# Patient Record
Sex: FEMALE | Race: WHITE | NOT HISPANIC OR LATINO | Employment: FULL TIME | URBAN - NONMETROPOLITAN AREA
[De-identification: names, ages, dates, MRNs, and addresses within clinical notes are randomized per-mention and may not be internally consistent; named-entity substitution may affect disease eponyms.]

---

## 2017-01-17 ENCOUNTER — HOSPITAL ENCOUNTER (OUTPATIENT)
Dept: GENERAL RADIOLOGY | Facility: HOSPITAL | Age: 57
Discharge: HOME | End: 2017-01-17

## 2018-01-24 ENCOUNTER — TRANSCRIBE ORDERS (OUTPATIENT)
Dept: ADMINISTRATIVE | Facility: HOSPITAL | Age: 58
End: 2018-01-24

## 2018-01-24 DIAGNOSIS — Z12.39 BREAST CANCER SCREENING: Primary | ICD-10-CM

## 2018-01-30 ENCOUNTER — HOSPITAL ENCOUNTER (OUTPATIENT)
Dept: MAMMOGRAPHY | Facility: HOSPITAL | Age: 58
Discharge: HOME | End: 2018-01-30
Admitting: PHYSICIAN ASSISTANT

## 2018-01-30 DIAGNOSIS — Z12.39 BREAST CANCER SCREENING: ICD-10-CM

## 2018-01-30 PROCEDURE — 77067 SCR MAMMO BI INCL CAD: CPT

## 2018-01-30 PROCEDURE — 77063 BREAST TOMOSYNTHESIS BI: CPT

## 2018-09-04 ENCOUNTER — OFFICE VISIT (OUTPATIENT)
Dept: PULMONOLOGY | Facility: CLINIC | Age: 58
End: 2018-09-04

## 2018-09-04 VITALS
OXYGEN SATURATION: 94 % | HEIGHT: 65 IN | HEART RATE: 58 BPM | RESPIRATION RATE: 18 BRPM | SYSTOLIC BLOOD PRESSURE: 110 MMHG | WEIGHT: 260 LBS | DIASTOLIC BLOOD PRESSURE: 70 MMHG | BODY MASS INDEX: 43.32 KG/M2

## 2018-09-04 DIAGNOSIS — R06.83 SNORING: Primary | ICD-10-CM

## 2018-09-04 DIAGNOSIS — G47.33 OBSTRUCTIVE SLEEP APNEA: ICD-10-CM

## 2018-09-04 DIAGNOSIS — E66.01 MORBID OBESITY, UNSPECIFIED OBESITY TYPE (HCC): ICD-10-CM

## 2018-09-04 DIAGNOSIS — G47.19 EXCESSIVE DAYTIME SLEEPINESS: ICD-10-CM

## 2018-09-04 PROCEDURE — 99214 OFFICE O/P EST MOD 30 MIN: CPT | Performed by: INTERNAL MEDICINE

## 2018-09-04 RX ORDER — ATORVASTATIN CALCIUM 10 MG/1
10 TABLET, FILM COATED ORAL DAILY
COMMUNITY
Start: 2018-06-10

## 2018-09-04 RX ORDER — LOSARTAN POTASSIUM AND HYDROCHLOROTHIAZIDE 12.5; 5 MG/1; MG/1
TABLET ORAL
COMMUNITY
Start: 2018-06-10

## 2018-09-04 RX ORDER — BISOPROLOL FUMARATE 5 MG/1
5 TABLET, FILM COATED ORAL DAILY
COMMUNITY
Start: 2018-06-10 | End: 2018-12-04

## 2018-09-04 RX ORDER — AMITRIPTYLINE HYDROCHLORIDE 25 MG/1
25 TABLET, FILM COATED ORAL NIGHTLY
COMMUNITY
Start: 2018-08-16

## 2018-09-04 RX ORDER — OMEPRAZOLE 40 MG/1
CAPSULE, DELAYED RELEASE ORAL
COMMUNITY
Start: 2018-08-06 | End: 2018-12-04

## 2018-09-04 NOTE — PROGRESS NOTES
"Chief Complaint   Patient presents with   • Follow-up   • Sleeping Problem         Subjective   Dorie Garber is a 58 y.o. female.     History of Present Illness   Patient in follow-up after 2 years of last been seen in this office.  Patient says that since her last visit she's developed atrial fibrillation which was mostly paroxysmal.    The patient was also found to have nocturnal hypoxemia, after her oxygen levels were assessed by her cardiologist after the episode of atrial fibrillation.  The patient actually does mention increased snoring and tendency to fall asleep while watching TV, which is a definite change from a year and half ago.    She denies any significant headaches in the morning.  She does have daytime sleepiness and tiredness as well.    The patient does drink 3-4 cups of soda per day.      The following portions of the patient's history were reviewed and updated as appropriate: allergies, current medications, past family history, past medical history, past social history and past surgical history.    Review of Systems   Constitutional: Negative for chills and fever.   HENT: Negative for sinus pressure, sneezing and sore throat.    Respiratory: Negative for cough and shortness of breath.    Psychiatric/Behavioral: Positive for sleep disturbance.       Objective   Visit Vitals  /70   Pulse 58   Resp 18   Ht 165.1 cm (65\")   Wt 118 kg (260 lb)   SpO2 94%   BMI 43.27 kg/m²       Physical Exam   Constitutional: She is oriented to person, place, and time. She appears well-developed and well-nourished.   HENT:   Head: Atraumatic.   Crowded oropharynx.    Cardiovascular: Normal rate and regular rhythm.    Pulmonary/Chest: Effort normal.   Musculoskeletal:   Gait was normal.   Neurological: She is alert and oriented to person, place, and time.   Psychiatric: She has a normal mood and affect.   Vitals reviewed.      Assessment/Plan   Dorie was seen today for follow-up and sleeping " problem.    Diagnoses and all orders for this visit:    Snoring  -     Polysomnography 4 or More Parameters; Future    Excessive daytime sleepiness  -     Polysomnography 4 or More Parameters; Future    Obstructive sleep apnea  -     Polysomnography 4 or More Parameters; Future    Morbid obesity, unspecified obesity type (CMS/HCC)  -     Polysomnography 4 or More Parameters; Future         Return in about 3 months (around 12/4/2018) for Recheck, Sleep study, For July.    DISCUSSION (if any):  Based on her symptoms, she will definitely need a sleep study will be performed.    I once again discussed sleep hygiene measures.    I will try to obtain her last overnight pulse oximetry testing from Dr. Mccain's office.    Due to recent h/o atrial fibrillation, she will need to undergo PSG in the lab.      Dictated utilizing Dragon dictation.    This document was electronically signed by Xavier Mcarthur MD September 4, 2018  2:00 PM

## 2018-09-14 ENCOUNTER — HOSPITAL ENCOUNTER (OUTPATIENT)
Dept: SLEEP MEDICINE | Facility: HOSPITAL | Age: 58
Discharge: HOME | End: 2018-09-14
Attending: INTERNAL MEDICINE | Admitting: INTERNAL MEDICINE

## 2018-09-14 DIAGNOSIS — G47.19 EXCESSIVE DAYTIME SLEEPINESS: ICD-10-CM

## 2018-09-14 DIAGNOSIS — G47.33 OBSTRUCTIVE SLEEP APNEA: ICD-10-CM

## 2018-09-14 DIAGNOSIS — R06.83 SNORING: ICD-10-CM

## 2018-09-14 DIAGNOSIS — E66.01 MORBID OBESITY, UNSPECIFIED OBESITY TYPE (HCC): ICD-10-CM

## 2018-09-14 PROCEDURE — 95810 POLYSOM 6/> YRS 4/> PARAM: CPT | Performed by: INTERNAL MEDICINE

## 2018-09-14 PROCEDURE — 95810 POLYSOM 6/> YRS 4/> PARAM: CPT

## 2018-09-21 DIAGNOSIS — G47.33 OSA (OBSTRUCTIVE SLEEP APNEA): Primary | ICD-10-CM

## 2018-12-04 ENCOUNTER — OFFICE VISIT (OUTPATIENT)
Dept: PULMONOLOGY | Facility: CLINIC | Age: 58
End: 2018-12-04

## 2018-12-04 VITALS
OXYGEN SATURATION: 97 % | DIASTOLIC BLOOD PRESSURE: 70 MMHG | HEIGHT: 65 IN | WEIGHT: 246 LBS | BODY MASS INDEX: 40.98 KG/M2 | HEART RATE: 62 BPM | SYSTOLIC BLOOD PRESSURE: 128 MMHG

## 2018-12-04 DIAGNOSIS — G47.19 EXCESSIVE DAYTIME SLEEPINESS: ICD-10-CM

## 2018-12-04 DIAGNOSIS — E66.01 MORBID OBESITY, UNSPECIFIED OBESITY TYPE (HCC): ICD-10-CM

## 2018-12-04 DIAGNOSIS — G47.33 OSA (OBSTRUCTIVE SLEEP APNEA): Primary | ICD-10-CM

## 2018-12-04 PROCEDURE — 99213 OFFICE O/P EST LOW 20 MIN: CPT | Performed by: NURSE PRACTITIONER

## 2018-12-04 RX ORDER — OMEPRAZOLE 20 MG/1
CAPSULE, DELAYED RELEASE ORAL
Refills: 2 | COMMUNITY
Start: 2018-11-21

## 2018-12-04 NOTE — PROGRESS NOTES
"Chief Complaint   Patient presents with   • Follow-up   • Snoring   • Shortness of Breath         Subjective   Dorie Garber is a 58 y.o. female.     History of Present Illness   The patient comes in today for follow-up of obstructive sleep apnea.    She is using AutoPap at a pressure of 6/14 with a full facemask.  She feels rested upon awakening and has more energy.    She had bariatric surgery in October and states overall she's been feeling much better.    The following portions of the patient's history were reviewed and updated as appropriate: allergies, current medications, past family history, past medical history, past social history and past surgical history.    Review of Systems   Constitutional: Negative for chills and fever.   HENT: Negative for sinus pressure, sneezing and sore throat.    Respiratory: Negative for cough, shortness of breath and wheezing.        Objective   Visit Vitals  /70 (BP Location: Left arm, Patient Position: Sitting, Cuff Size: Adult)   Pulse 62   Ht 165.1 cm (65\")   Wt 112 kg (246 lb)   SpO2 97%   BMI 40.94 kg/m²     Physical Exam   Constitutional: She is oriented to person, place, and time. She appears well-developed and well-nourished.   HENT:   Head: Atraumatic.   Crowded oropharynx.    Eyes: EOM are normal.   Musculoskeletal:   Gait was normal.   Neurological: She is alert and oriented to person, place, and time.   Psychiatric: She has a normal mood and affect.   Vitals reviewed.          Assessment/Plan   Dorie was seen today for follow-up, snoring and shortness of breath.    Diagnoses and all orders for this visit:    NELLY (obstructive sleep apnea)  -     BIPAP / CPAP Adjustment    Excessive daytime sleepiness    Morbid obesity, unspecified obesity type (CMS/HCC)           Return in about 4 months (around 4/4/2019) for Recheck, For Dr. Mcarthur.    DISCUSSION (if any):  She is compliant with AutoPap use at 97%.  Her average AHI is 1.7.  I've asked her to continue using " AutoPap at the current pressure of 6/14 with a full facemask.      Dictated utilizing Dragon dictation.    This document was electronically signed by VAISHNAVI Castillo December 12, 2018  12:09 PM

## 2019-01-23 ENCOUNTER — TRANSCRIBE ORDERS (OUTPATIENT)
Dept: ADMINISTRATIVE | Facility: HOSPITAL | Age: 59
End: 2019-01-23

## 2019-01-23 DIAGNOSIS — Z12.39 SCREENING BREAST EXAMINATION: Primary | ICD-10-CM

## 2019-03-08 ENCOUNTER — HOSPITAL ENCOUNTER (OUTPATIENT)
Dept: MAMMOGRAPHY | Facility: HOSPITAL | Age: 59
Discharge: HOME | End: 2019-03-08
Admitting: PHYSICIAN ASSISTANT

## 2019-03-08 DIAGNOSIS — Z12.39 SCREENING BREAST EXAMINATION: ICD-10-CM

## 2019-03-08 PROCEDURE — 77063 BREAST TOMOSYNTHESIS BI: CPT

## 2019-03-08 PROCEDURE — 77067 SCR MAMMO BI INCL CAD: CPT

## 2019-07-16 ENCOUNTER — OFFICE VISIT (OUTPATIENT)
Dept: NEUROSURGERY | Facility: CLINIC | Age: 59
End: 2019-07-16

## 2019-07-16 VITALS — WEIGHT: 246 LBS | HEIGHT: 65 IN | BODY MASS INDEX: 40.98 KG/M2

## 2019-07-16 DIAGNOSIS — M50.20 HERNIATION OF CERVICAL INTERVERTEBRAL DISC: Primary | ICD-10-CM

## 2019-07-16 PROCEDURE — 99243 OFF/OP CNSLTJ NEW/EST LOW 30: CPT | Performed by: NEUROLOGICAL SURGERY

## 2019-07-16 NOTE — PROGRESS NOTES
Subjective   Patient ID: Dorie Garber is a 58 y.o. 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  She has a history of pain in the neck and left arm that began approximately 6 weeks ago and has shown improvement in the past 2 weeks or so.  It radiates to her left arm into the index and middle finger.  She has been taking physical therapy.  She had a similar episode about 2 years ago that resolved spontaneously.  She has been using gabapentin.  She used amitriptyline 2 years ago which appeared to have some help in resolving her pain.    Review of Radiographic Studies:  Cervical MRI scan dated 6/24/2019 shows a cervical disc herniation at C6-7 on the left, and a mild stenosis at C5-6.    The following portions of the patient's history were reviewed, updated as appropriate and approved: allergies, current medications, past family history, past medical history, past social history, past surgical history, review of systems and problem list.  Review of Systems   Constitutional: Negative for activity change, appetite change, chills, diaphoresis, fatigue, fever and unexpected weight change.   HENT: Negative for congestion, dental problem, drooling, ear discharge, ear pain, facial swelling, hearing loss, mouth sores, nosebleeds, postnasal drip, rhinorrhea, sinus pressure, sneezing, sore throat, tinnitus, trouble swallowing and voice change.    Eyes: Negative for photophobia, pain, discharge, redness, itching and visual disturbance.   Respiratory: Negative for apnea, cough, choking, chest tightness, shortness of breath, wheezing and stridor.    Cardiovascular: Negative for chest pain, palpitations and leg swelling.   Gastrointestinal: Negative for abdominal distention, abdominal pain, anal bleeding, blood in stool, constipation, diarrhea,  nausea, rectal pain and vomiting.   Endocrine: Negative for cold intolerance, heat intolerance, polydipsia, polyphagia and polyuria.   Genitourinary: Negative for decreased urine volume, difficulty urinating, dysuria, enuresis, flank pain, frequency, genital sores, hematuria and urgency.   Musculoskeletal: Positive for myalgias and neck pain. Negative for arthralgias, back pain, gait problem, joint swelling and neck stiffness.   Skin: Negative for color change, pallor, rash and wound.   Allergic/Immunologic: Negative for environmental allergies, food allergies and immunocompromised state.   Neurological: Positive for dizziness. Negative for tremors, seizures, syncope, facial asymmetry, speech difficulty, weakness, light-headedness, numbness and headaches.   Hematological: Negative for adenopathy. Does not bruise/bleed easily.   Psychiatric/Behavioral: Negative for agitation, behavioral problems, confusion, decreased concentration, dysphoric mood, hallucinations, self-injury, sleep disturbance and suicidal ideas. The patient is not nervous/anxious and is not hyperactive.        Objective     NEUROLOGICAL EXAMINATION:      MENTAL STATUS:  Alert and oriented.  Speech intact.  Recent and remote memory intact.      CRANIAL NERVES:  Cranial nerve II:  Visual fields are full.  Cranial nerves III, IV and VI:  PERRLADC.  Extraocular movements are intact.  Nystagmus is not present.  Cranial nerve V:  Facial sensation is intact.  Cranial nerve VII:  Muscles of facial expression reveal no asymmetry.  Cranial nerve VIII:  Hearing is intact.  Cranial nerves IX and X:  Palate elevates symmetrically.  Cranial nerve XI:  Shoulder shrug is intact.  Cranial nerve XII:  Tongue is midline without evidence of atrophy or fasciculation.    MUSCULOSKELETAL: Cervical range of motion symmetrical and intact.    MOTOR: No deltoid, biceps, triceps, or  strength weakness on either side.    SENSATION: Numbness of the index and middle finger on  the left with some numbness of the tip of the thumb    REFLEXES:  DTR trace and difficult to elicit bilaterally biceps and triceps    Assessment   Symptomatic cervical disc herniation C6-7 left       Plan   Her treatment plan is appropriate already.  Physical therapy is the treatment of choice.  Surgery will not be necessary with this early improvement in the radiculopathy.  No different recommendation to make at this time.  Continue with weight loss.       Marbin Shaw MD

## 2020-07-14 ENCOUNTER — TRANSCRIBE ORDERS (OUTPATIENT)
Dept: MAMMOGRAPHY | Facility: HOSPITAL | Age: 60
End: 2020-07-14

## 2020-07-14 DIAGNOSIS — Z12.31 ENCOUNTER FOR SCREENING MAMMOGRAM FOR BREAST CANCER: Primary | ICD-10-CM

## 2020-08-18 ENCOUNTER — HOSPITAL ENCOUNTER (OUTPATIENT)
Dept: MAMMOGRAPHY | Facility: HOSPITAL | Age: 60
Discharge: HOME | End: 2020-08-18
Admitting: PHYSICIAN ASSISTANT

## 2020-08-18 DIAGNOSIS — Z12.31 ENCOUNTER FOR SCREENING MAMMOGRAM FOR BREAST CANCER: ICD-10-CM

## 2020-08-18 PROCEDURE — 77063 BREAST TOMOSYNTHESIS BI: CPT

## 2020-08-18 PROCEDURE — 77067 SCR MAMMO BI INCL CAD: CPT

## 2022-04-04 ENCOUNTER — TRANSCRIBE ORDERS (OUTPATIENT)
Dept: ADMINISTRATIVE | Facility: HOSPITAL | Age: 62
End: 2022-04-04

## 2022-04-04 DIAGNOSIS — Z12.39 BREAST SCREENING: Primary | ICD-10-CM

## 2022-06-02 ENCOUNTER — HOSPITAL ENCOUNTER (OUTPATIENT)
Dept: MAMMOGRAPHY | Facility: HOSPITAL | Age: 62
Discharge: HOME | End: 2022-06-02
Admitting: PHYSICIAN ASSISTANT

## 2022-06-02 DIAGNOSIS — Z12.39 BREAST SCREENING: ICD-10-CM

## 2022-06-02 PROCEDURE — 77067 SCR MAMMO BI INCL CAD: CPT

## 2022-06-02 PROCEDURE — 77063 BREAST TOMOSYNTHESIS BI: CPT

## 2023-03-02 ENCOUNTER — TRANSCRIBE ORDERS (OUTPATIENT)
Dept: ADMINISTRATIVE | Facility: HOSPITAL | Age: 63
End: 2023-03-02
Payer: COMMERCIAL

## 2023-03-02 DIAGNOSIS — Z12.31 VISIT FOR SCREENING MAMMOGRAM: Primary | ICD-10-CM

## 2023-06-07 ENCOUNTER — HOSPITAL ENCOUNTER (OUTPATIENT)
Dept: MAMMOGRAPHY | Facility: HOSPITAL | Age: 63
Discharge: HOME | End: 2023-06-07
Admitting: PHYSICIAN ASSISTANT
Payer: COMMERCIAL

## 2023-06-07 DIAGNOSIS — Z12.31 VISIT FOR SCREENING MAMMOGRAM: ICD-10-CM

## 2023-06-07 PROCEDURE — 77063 BREAST TOMOSYNTHESIS BI: CPT

## 2023-06-07 PROCEDURE — 77067 SCR MAMMO BI INCL CAD: CPT

## 2023-06-13 ENCOUNTER — TRANSCRIBE ORDERS (OUTPATIENT)
Dept: ADMINISTRATIVE | Facility: HOSPITAL | Age: 63
End: 2023-06-13
Payer: COMMERCIAL

## 2023-06-13 DIAGNOSIS — R92.8 ABNORMAL MAMMOGRAM: Primary | ICD-10-CM

## 2023-09-07 ENCOUNTER — HOSPITAL ENCOUNTER (OUTPATIENT)
Dept: ULTRASOUND IMAGING | Facility: HOSPITAL | Age: 63
Discharge: HOME | End: 2023-09-07
Payer: COMMERCIAL

## 2023-09-07 ENCOUNTER — HOSPITAL ENCOUNTER (OUTPATIENT)
Dept: MAMMOGRAPHY | Facility: HOSPITAL | Age: 63
Discharge: HOME | End: 2023-09-07
Payer: COMMERCIAL

## 2023-09-07 DIAGNOSIS — R92.8 ABNORMAL MAMMOGRAM: ICD-10-CM

## 2023-09-07 PROCEDURE — G0279 TOMOSYNTHESIS, MAMMO: HCPCS

## 2023-09-07 PROCEDURE — 76642 ULTRASOUND BREAST LIMITED: CPT

## 2023-09-07 PROCEDURE — 77065 DX MAMMO INCL CAD UNI: CPT

## 2023-09-13 ENCOUNTER — HOSPITAL ENCOUNTER (OUTPATIENT)
Dept: ULTRASOUND IMAGING | Facility: HOSPITAL | Age: 63
Discharge: HOME | End: 2023-09-13
Payer: COMMERCIAL

## 2023-09-13 ENCOUNTER — HOSPITAL ENCOUNTER (OUTPATIENT)
Dept: MAMMOGRAPHY | Facility: HOSPITAL | Age: 63
Discharge: HOME | End: 2023-09-13
Payer: COMMERCIAL

## 2023-09-13 DIAGNOSIS — N63.0 BREAST MASS IN FEMALE: ICD-10-CM

## 2023-09-14 LAB — REF LAB TEST METHOD: NORMAL

## 2024-02-21 ENCOUNTER — TRANSCRIBE ORDERS (OUTPATIENT)
Dept: ADMINISTRATIVE | Facility: HOSPITAL | Age: 64
End: 2024-02-21
Payer: COMMERCIAL

## 2024-02-21 DIAGNOSIS — R92.8 ABNORMAL MAMMOGRAM: Primary | ICD-10-CM

## 2024-03-28 ENCOUNTER — HOSPITAL ENCOUNTER (OUTPATIENT)
Dept: MAMMOGRAPHY | Facility: HOSPITAL | Age: 64
Discharge: HOME | End: 2024-03-28
Admitting: PHYSICIAN ASSISTANT
Payer: COMMERCIAL

## 2024-03-28 DIAGNOSIS — R92.8 ABNORMAL MAMMOGRAM: ICD-10-CM

## 2024-03-28 PROCEDURE — G0279 TOMOSYNTHESIS, MAMMO: HCPCS

## 2024-03-28 PROCEDURE — 77065 DX MAMMO INCL CAD UNI: CPT

## 2024-06-07 ENCOUNTER — TRANSCRIBE ORDERS (OUTPATIENT)
Dept: ADMINISTRATIVE | Facility: HOSPITAL | Age: 64
End: 2024-06-07
Payer: COMMERCIAL

## 2024-06-07 DIAGNOSIS — Z12.31 VISIT FOR SCREENING MAMMOGRAM: Primary | ICD-10-CM
